# Patient Record
Sex: OTHER/UNKNOWN | URBAN - METROPOLITAN AREA
[De-identification: names, ages, dates, MRNs, and addresses within clinical notes are randomized per-mention and may not be internally consistent; named-entity substitution may affect disease eponyms.]

---

## 2020-11-25 ENCOUNTER — APPOINTMENT (OUTPATIENT)
Age: 80
Setting detail: DERMATOLOGY
End: 2020-11-28

## 2020-11-25 ENCOUNTER — RX ONLY (RX ONLY)
Age: 80
End: 2020-11-25

## 2020-11-25 VITALS
TEMPERATURE: 97 F | SYSTOLIC BLOOD PRESSURE: 152 MMHG | DIASTOLIC BLOOD PRESSURE: 60 MMHG | WEIGHT: 102 LBS | HEIGHT: 64 IN | HEART RATE: 76 BPM

## 2020-11-25 DIAGNOSIS — L21.8 OTHER SEBORRHEIC DERMATITIS: ICD-10-CM

## 2020-11-25 PROCEDURE — 99203 OFFICE O/P NEW LOW 30 MIN: CPT

## 2020-11-25 PROCEDURE — OTHER COUNSELING: OTHER

## 2020-11-25 PROCEDURE — OTHER MIPS QUALITY: OTHER

## 2020-11-25 PROCEDURE — OTHER TREATMENT REGIMEN: OTHER

## 2020-11-25 PROCEDURE — OTHER PRESCRIPTION: OTHER

## 2020-11-25 RX ORDER — FLUOCINOLONE ACETONIDE 0.11 MG/ML
1 OIL TOPICAL QAM
Qty: 1 | Refills: 0

## 2020-11-25 RX ORDER — TRIAMCINOLONE ACETONIDE 0.25 MG/G
1 OINTMENT TOPICAL BID
Qty: 3 | Refills: 0 | Status: ERX | COMMUNITY
Start: 2020-11-25

## 2020-11-25 RX ORDER — FLUOCINOLONE ACETONIDE 0.11 MG/ML
1 OIL TOPICAL QAM
Qty: 1 | Refills: 0 | Status: ERX | COMMUNITY
Start: 2020-11-25

## 2020-11-25 ASSESSMENT — LOCATION SIMPLE DESCRIPTION DERM
LOCATION SIMPLE: SCALP
LOCATION SIMPLE: RIGHT SCALP
LOCATION SIMPLE: RIGHT BREAST
LOCATION SIMPLE: LEFT BREAST
LOCATION SIMPLE: POSTERIOR SCALP
LOCATION SIMPLE: RIGHT EAR

## 2020-11-25 ASSESSMENT — LOCATION DETAILED DESCRIPTION DERM
LOCATION DETAILED: LEFT MEDIAL BREAST 6-7:00 REGION
LOCATION DETAILED: RIGHT SUPERIOR PARIETAL SCALP
LOCATION DETAILED: LEFT POSTAURICULAR SKIN
LOCATION DETAILED: LEFT SUPERIOR PARIETAL SCALP
LOCATION DETAILED: RIGHT POSTERIOR EAR
LOCATION DETAILED: RIGHT MEDIAL FRONTAL SCALP
LOCATION DETAILED: RIGHT LATERAL BREAST 6-7:00 REGION

## 2020-11-25 ASSESSMENT — LOCATION ZONE DERM
LOCATION ZONE: TRUNK
LOCATION ZONE: SCALP
LOCATION ZONE: EAR

## 2020-11-25 NOTE — PROCEDURE: MIPS QUALITY
Name And Contact Information For Health Care Proxy: [place] [phone] Name And Contact Information For Health Care Proxy: [place] [phone]

## 2020-11-25 NOTE — PROCEDURE: TREATMENT REGIMEN
Samples Given: Neutrogena Tsal shampoo once a day to rinse out oil \\nCeraVe hydrating cleanser \\nCeraVe  moisturizer lotion
Detail Level: Zone

## 2021-01-20 ENCOUNTER — APPOINTMENT (OUTPATIENT)
Age: 81
Setting detail: DERMATOLOGY
End: 2021-01-26

## 2021-01-20 VITALS
HEIGHT: 64 IN | WEIGHT: 102 LBS | HEART RATE: 80 BPM | SYSTOLIC BLOOD PRESSURE: 152 MMHG | DIASTOLIC BLOOD PRESSURE: 70 MMHG

## 2021-01-20 DIAGNOSIS — L21.8 OTHER SEBORRHEIC DERMATITIS: ICD-10-CM

## 2021-01-20 PROCEDURE — OTHER COUNSELING: OTHER

## 2021-01-20 PROCEDURE — 99213 OFFICE O/P EST LOW 20 MIN: CPT

## 2021-01-20 PROCEDURE — OTHER MIPS QUALITY: OTHER

## 2021-01-20 PROCEDURE — OTHER PRESCRIPTION: OTHER

## 2021-01-20 PROCEDURE — OTHER TREATMENT REGIMEN: OTHER

## 2021-01-20 RX ORDER — FLUOCINOLONE ACETONIDE 0.11 MG/ML
1 OIL TOPICAL QAM
Qty: 1 | Refills: 0 | Status: ERX

## 2021-01-20 RX ORDER — TRIAMCINOLONE ACETONIDE 0.25 MG/G
1 OINTMENT TOPICAL BID
Qty: 3 | Refills: 0 | Status: CANCELLED
Stop reason: CLARIF

## 2021-01-20 ASSESSMENT — LOCATION ZONE DERM
LOCATION ZONE: EAR
LOCATION ZONE: SCALP
LOCATION ZONE: TRUNK

## 2021-01-20 ASSESSMENT — LOCATION SIMPLE DESCRIPTION DERM
LOCATION SIMPLE: RIGHT EAR
LOCATION SIMPLE: POSTERIOR SCALP
LOCATION SIMPLE: SCALP
LOCATION SIMPLE: RIGHT BREAST
LOCATION SIMPLE: RIGHT SCALP
LOCATION SIMPLE: LEFT BREAST

## 2021-01-20 ASSESSMENT — LOCATION DETAILED DESCRIPTION DERM
LOCATION DETAILED: LEFT SUPERIOR PARIETAL SCALP
LOCATION DETAILED: LEFT POSTAURICULAR SKIN
LOCATION DETAILED: RIGHT SUPERIOR PARIETAL SCALP
LOCATION DETAILED: RIGHT POSTERIOR EAR
LOCATION DETAILED: RIGHT MEDIAL FRONTAL SCALP
LOCATION DETAILED: LEFT MEDIAL BREAST 6-7:00 REGION
LOCATION DETAILED: RIGHT LATERAL BREAST 6-7:00 REGION

## 2021-01-20 NOTE — PROCEDURE: TREATMENT REGIMEN
Detail Level: Zone
Continue Regimen: fluocinolone 0.01 % scalp oil and shower cap three times weekly\\n\\ntriamcinolone acetonide 0.025 % topical ointment Bid\\nDays Supply: 30\\nSig: Apply to affected areas behind the ear and under the breast twice a day x 3 weeks only as needed for flares\\n\\nNeutrogena Tsal shampoo once a day to rinse out oil \\nCeraVe hydrating cleanser \\nCeraVe  moisturizer lotion

## 2021-01-20 NOTE — PROCEDURE: MIPS QUALITY
Name And Contact Information For Health Care Proxy: [place] [phone] Name And Contact Information For Health Care Proxy: [place] [phone]